# Patient Record
Sex: MALE | URBAN - METROPOLITAN AREA
[De-identification: names, ages, dates, MRNs, and addresses within clinical notes are randomized per-mention and may not be internally consistent; named-entity substitution may affect disease eponyms.]

---

## 2022-12-08 ENCOUNTER — APPOINTMENT (RX ONLY)
Dept: URBAN - METROPOLITAN AREA CLINIC 186 | Facility: CLINIC | Age: 42
Setting detail: DERMATOLOGY
End: 2022-12-08

## 2022-12-08 DIAGNOSIS — L21.8 OTHER SEBORRHEIC DERMATITIS: ICD-10-CM

## 2022-12-08 PROCEDURE — ? COUNSELING

## 2022-12-08 PROCEDURE — ? PRESCRIPTION

## 2022-12-08 PROCEDURE — 99213 OFFICE O/P EST LOW 20 MIN: CPT

## 2022-12-08 RX ORDER — KETOCONAZOLE 20 MG/ML
SHAMPOO, SUSPENSION TOPICAL BIW
Qty: 120 | Refills: 3 | Status: ERX | COMMUNITY
Start: 2022-12-08

## 2022-12-08 RX ORDER — KETOCONAZOLE 20 MG/G
CREAM TOPICAL BID
Qty: 60 | Refills: 3 | Status: ERX | COMMUNITY
Start: 2022-12-08

## 2022-12-08 RX ORDER — KETOCONAZOLE 20 MG/G
CREAM TOPICAL BID
Qty: 60 | Refills: 3 | Status: ERX

## 2022-12-08 RX ORDER — TRIAMCINOLONE ACETONIDE 0.25 MG/G
CREAM TOPICAL
Qty: 80 | Refills: 1 | Status: ERX | COMMUNITY
Start: 2022-12-08

## 2022-12-08 RX ADMIN — TRIAMCINOLONE ACETONIDE: 0.25 CREAM TOPICAL at 00:00

## 2022-12-08 RX ADMIN — KETOCONAZOLE: 20 CREAM TOPICAL at 00:00

## 2022-12-08 RX ADMIN — KETOCONAZOLE: 20 SHAMPOO, SUSPENSION TOPICAL at 00:00

## 2022-12-08 ASSESSMENT — LOCATION ZONE DERM: LOCATION ZONE: EAR

## 2022-12-08 ASSESSMENT — LOCATION DETAILED DESCRIPTION DERM
LOCATION DETAILED: RIGHT CYMBA CONCHA
LOCATION DETAILED: LEFT CYMBA CONCHA

## 2022-12-08 ASSESSMENT — LOCATION SIMPLE DESCRIPTION DERM
LOCATION SIMPLE: LEFT EAR
LOCATION SIMPLE: RIGHT EAR

## 2024-02-27 ENCOUNTER — APPOINTMENT (RX ONLY)
Dept: URBAN - METROPOLITAN AREA CLINIC 186 | Facility: CLINIC | Age: 44
Setting detail: DERMATOLOGY
End: 2024-02-27

## 2024-02-27 DIAGNOSIS — L21.8 OTHER SEBORRHEIC DERMATITIS: ICD-10-CM

## 2024-02-27 PROCEDURE — ? ADDITIONAL NOTES

## 2024-02-27 PROCEDURE — 99213 OFFICE O/P EST LOW 20 MIN: CPT

## 2024-02-27 PROCEDURE — ? PRESCRIPTION

## 2024-02-27 PROCEDURE — ? COUNSELING

## 2024-02-27 RX ORDER — HYDROCORTISONE 25 MG/G
OINTMENT TOPICAL
Qty: 28.35 | Refills: 3 | Status: ERX | COMMUNITY
Start: 2024-02-27

## 2024-02-27 RX ORDER — ROFLUMILAST 3 MG/G
AEROSOL, FOAM TOPICAL
Qty: 60 | Refills: 0 | Status: ERX | COMMUNITY
Start: 2024-02-27

## 2024-02-27 RX ADMIN — HYDROCORTISONE: 25 OINTMENT TOPICAL at 00:00

## 2024-02-27 RX ADMIN — ROFLUMILAST: 3 AEROSOL, FOAM TOPICAL at 00:00

## 2024-02-27 ASSESSMENT — LOCATION SIMPLE DESCRIPTION DERM
LOCATION SIMPLE: RIGHT EAR
LOCATION SIMPLE: LEFT EAR

## 2024-02-27 ASSESSMENT — LOCATION ZONE DERM: LOCATION ZONE: EAR

## 2024-02-27 ASSESSMENT — LOCATION DETAILED DESCRIPTION DERM
LOCATION DETAILED: LEFT CRUS OF HELIX
LOCATION DETAILED: RIGHT CRUS OF HELIX

## 2024-02-27 NOTE — PROCEDURE: ADDITIONAL NOTES
Render Risk Assessment In Note?: no
Additional Notes: Referral to [facility] as patient also did mention that irruption worsens when he uses ear buds
Detail Level: Zone

## 2024-08-21 ENCOUNTER — APPOINTMENT (RX ONLY)
Dept: URBAN - METROPOLITAN AREA CLINIC 186 | Facility: CLINIC | Age: 44
Setting detail: DERMATOLOGY
End: 2024-08-21

## 2024-08-21 DIAGNOSIS — B36.0 PITYRIASIS VERSICOLOR: ICD-10-CM | Status: INADEQUATELY CONTROLLED

## 2024-08-21 DIAGNOSIS — D18.0 HEMANGIOMA: ICD-10-CM

## 2024-08-21 DIAGNOSIS — D22 MELANOCYTIC NEVI: ICD-10-CM

## 2024-08-21 PROBLEM — D18.01 HEMANGIOMA OF SKIN AND SUBCUTANEOUS TISSUE: Status: ACTIVE | Noted: 2024-08-21

## 2024-08-21 PROBLEM — D22.5 MELANOCYTIC NEVI OF TRUNK: Status: ACTIVE | Noted: 2024-08-21

## 2024-08-21 PROCEDURE — ? PRESCRIPTION MEDICATION MANAGEMENT

## 2024-08-21 PROCEDURE — ? PRESCRIPTION

## 2024-08-21 PROCEDURE — 99213 OFFICE O/P EST LOW 20 MIN: CPT

## 2024-08-21 PROCEDURE — ? COUNSELING

## 2024-08-21 PROCEDURE — ? SUNSCREEN RECOMMENDATIONS

## 2024-08-21 RX ORDER — KETOCONAZOLE 20 MG/G
CREAM TOPICAL BID
Qty: 60 | Refills: 1 | Status: ERX

## 2024-08-21 ASSESSMENT — LOCATION DETAILED DESCRIPTION DERM
LOCATION DETAILED: SUPERIOR THORACIC SPINE
LOCATION DETAILED: INFERIOR THORACIC SPINE
LOCATION DETAILED: EPIGASTRIC SKIN
LOCATION DETAILED: PERIUMBILICAL SKIN

## 2024-08-21 ASSESSMENT — LOCATION SIMPLE DESCRIPTION DERM
LOCATION SIMPLE: UPPER BACK
LOCATION SIMPLE: ABDOMEN

## 2024-08-21 ASSESSMENT — LOCATION ZONE DERM: LOCATION ZONE: TRUNK

## 2024-08-21 NOTE — PROCEDURE: PRESCRIPTION MEDICATION MANAGEMENT
Initiate Treatment: ketoconazole 2 % topical cream. Apply to back BID until resolved.
Render In Strict Bullet Format?: No
Detail Level: Zone